# Patient Record
Sex: FEMALE | Race: WHITE | Employment: FULL TIME | URBAN - METROPOLITAN AREA
[De-identification: names, ages, dates, MRNs, and addresses within clinical notes are randomized per-mention and may not be internally consistent; named-entity substitution may affect disease eponyms.]

---

## 2023-12-21 ENCOUNTER — APPOINTMENT (OUTPATIENT)
Dept: PHYSICAL THERAPY | Age: 48
End: 2023-12-21
Payer: COMMERCIAL

## 2023-12-22 ENCOUNTER — HOSPITAL ENCOUNTER (OUTPATIENT)
Dept: PHYSICAL THERAPY | Age: 48
Setting detail: RECURRING SERIES
Discharge: HOME | End: 2023-12-25
Payer: COMMERCIAL

## 2023-12-22 PROCEDURE — 97110 THERAPEUTIC EXERCISES: CPT

## 2023-12-22 PROCEDURE — 97140 MANUAL THERAPY 1/> REGIONS: CPT

## 2023-12-22 NOTE — PROGRESS NOTES
Jorge Carter  : 1975  [facility]  Secondary:  [address] @ [address] [facility] [address]  Phone: [phone]  Fax: [phone] Plan Frequency: 2x/week for 6 weeks    Plan of Care/Certification Expiration Date: 24        Plan of Care/Certification Expiration Date:  Plan of Care/Certification Expiration Date: 24    Frequency/Duration:   Plan Frequency: 2x/week for 6 weeks      Time In/Out:   Time In: 1445  Time Out: 26      PT Visit Info:    Plan Frequency: 2x/week for 6 weeks      Visit Count:  2    OUTPATIENT PHYSICAL THERAPY:   Treatment Note 2023       Episode  (cervical stiffness/thoracic pain)               Treatment Diagnosis:  M54.6, M43.8  No data found  Medical/Referring Diagnosis:    Cervical spondylosis    Referring Physician:  Marilou Goodman MD MD Orders:  PT Eval and Treat   Return MD Appt:  TBD   Date of Onset:   Insidious onset 1 month ago  Allergies:   Patient has no allergy information on record. Restrictions/Precautions:   None      Interventions Planned (Treatment may consist of any combination of the following):     See Assessment Note    Subjective Comments: Patient reports that she is doing well today. Patient states that she has had some soreness and an ache in bilateral upper traps. Initial Pain Level[de-identified]      2/10  Post Session Pain Level:        1-2/10  Medications Last Reviewed:  2023  Updated Objective Findings:  None Today  Treatment   TREATMENT:   THERAPEUTIC ACTIVITY: ( see below for minutes): Therapeutic activities per grid below to improve mobility, strength, balance and coordination. Required minimal visual, verbal, manual and tactile cues to improve independence and safety with daily activities . THERAPEUTIC EXERCISE: (see below for minutes): Exercises per grid below to improve mobility, strength, balance and coordination.  Required minimal verbal and manual cues to promote

## 2023-12-27 ENCOUNTER — HOSPITAL ENCOUNTER (OUTPATIENT)
Dept: PHYSICAL THERAPY | Age: 48
Setting detail: RECURRING SERIES
Discharge: HOME | End: 2023-12-30
Payer: COMMERCIAL

## 2023-12-27 PROCEDURE — 97140 MANUAL THERAPY 1/> REGIONS: CPT

## 2023-12-27 PROCEDURE — 97110 THERAPEUTIC EXERCISES: CPT

## 2023-12-27 NOTE — PROGRESS NOTES
cues to promote proper body alignment, promote proper body posture and promote proper body mechanics. Progressed resistance, range, repetitions and complexity of movement as indicated. MANUAL THERAPY: (see below for minutes): Joint mobilization and Soft tissue mobilization was utilized and necessary because of the patient's restricted joint motion, painful spasm, loss of articular motion and restricted motion of soft tissue. MODALITIES: (see below for minutes): to decrease pain   SELF CARE: (see below for minutes): Procedure(s) (per grid) utilized to improve and/or restore self-care/home management as related to dressing, bathing and grooming. Required minimal verbal cueing to facilitate activities of daily living skills and compensatory activities     Date: 12/18/23 12/22/23 12/27/23 (visit 3)     Modalities:                                Therapeutic Exercise: 10 min 30 min 30 min      Cervical retraction 15x Cervical retraction 15x 2 Cervical retraction x15, seated      Cervical extension with rolled towel 15x Cervical extension with rolled towel 15x2 Cervical extension with rolled towed x15      Scapular retraction 15x Rows and extension  Green band  2 x 15 Rows 3x10 black    Shoulder extension on hold due to surgery      Posture slouch to overcorrect 5x ER with scapular retraction  Green 2 x15 ER with scapula retraction 3x10 green        Seated cervical extension with chin tuck x15             Proprioceptive Activities:                                Manual Therapy:  12 min 15 min       Soft tissue mobilization of B upper traps 2 x15 STM to B upper trapezius             Functional Activities:                                          Goals: (Goals have been discussed and agreed upon with patient.)  Short-Term Functional Goals: Time Frame: 3 weeks  Patient will be independent with HEP. Patient with be able to sit with good posture to work on computer without pain.   Patient will have cervical spine ROM WNL in

## 2024-01-02 ENCOUNTER — HOSPITAL ENCOUNTER (OUTPATIENT)
Dept: PHYSICAL THERAPY | Age: 49
Setting detail: RECURRING SERIES
Discharge: HOME | End: 2024-01-05
Payer: COMMERCIAL

## 2024-01-02 PROCEDURE — 97140 MANUAL THERAPY 1/> REGIONS: CPT

## 2024-01-02 PROCEDURE — 97110 THERAPEUTIC EXERCISES: CPT

## 2024-01-02 NOTE — PROGRESS NOTES
Goals: (Goals have been discussed and agreed upon with patient.)  Short-Term Functional Goals: Time Frame: 3 weeks  Patient will be independent with HEP.   Patient with be able to sit with good posture to work on computer without pain.  Patient will have cervical spine ROM WNL in order to look down and up without pain.  Discharge Goals: Time Frame: 6 weeks  Patient will be able to fall asleep and sleep entire night without waking due to neck pain.  Patient will have NDI score of 3 or less indicating improve function.  Patient will be able to complete all daily and work activities with 0/10 neck pain and no headaches.        Outcome Measure:   Tool Used: Neck Disability Index (NDI)  Score:  Initial: 8/50  Most Recent: X/50 (Date: -- )   Interpretation of Score: The Neck Disability Index is a revised form of the Oswestry Low Back Pain Index and is designed to measure the activities of daily living in person's with neck pain.  Each section is scored on a 0-5 scale, 5 representing the greatest disability.  The scores of each section are added together for a total score of 50.     Treatment/Session Summary:    Treatment Assessment: Patient able to complete all exercises and treatments today without increased pain. Initiated manual cervical traction today with no pain during or after. Monitor pain and symptoms at next visit and progress accordingly. Patient returned to work today working from home and returning to the office for the rest of the week.     Communication/Consultation:  None today  Equipment provided today:  None  Recommendations/Intent for next treatment session: Next visit will focus on posture correction and restoring strength, ROM and function.    >Total Treatment Billable Duration:  43 minutes   Time In: 1444  Time Out: 1527    Jerad Coronel PT         Charge Capture  [facility]  Appt Desk     Future Appointments   Date Time Provider Department Center   1/4/2024  2:45 PM Roxana Rosa, [facility]

## 2024-01-04 ENCOUNTER — HOSPITAL ENCOUNTER (OUTPATIENT)
Dept: PHYSICAL THERAPY | Age: 49
Setting detail: RECURRING SERIES
Discharge: HOME | End: 2024-01-07
Payer: COMMERCIAL

## 2024-01-04 PROCEDURE — 97110 THERAPEUTIC EXERCISES: CPT

## 2024-01-04 PROCEDURE — 97140 MANUAL THERAPY 1/> REGIONS: CPT

## 2024-01-04 NOTE — PROGRESS NOTES
Elena Jeremy  : 1975  Primary: Tai Wang ([facility])  Secondary:  [facility] [place]  [place] [address]  Phone: [phone]  Fax: [phone] Plan Frequency: 2x/week for 6 weeks    Plan of Care/Certification Expiration Date: 24        Plan of Care/Certification Expiration Date:  Plan of Care/Certification Expiration Date: 24    Frequency/Duration:   Plan Frequency: 2x/week for 6 weeks      Time In/Out:   Time In: 1445  Time Out: 1530      PT Visit Info:    Plan Frequency: 2x/week for 6 weeks      Visit Count:  5    OUTPATIENT PHYSICAL THERAPY:   Treatment Note 2024       Episode  (cervical stiffness/thoracic pain)               Treatment Diagnosis:  M54.6, M43.8  No data found  Medical/Referring Diagnosis:    Cervical spondylosis    Referring Physician:  Chucho Grant Jr., MD MD Orders:  PT Eval and Treat   Return MD Appt:  TBD   Date of Onset:   Insidious onset 1 month ago  Allergies:   Patient has no allergy information on record.  Restrictions/Precautions:   None    Pt had laparoscopic hysterectomy 3 weeks ago.  Interventions Planned (Treatment may consist of any combination of the following):     See Assessment Note    Subjective Comments: Patient reports no pain prior to treatment. She reports pain looking up for long periods of time.    Initial Pain Level::      0/10  Post Session Pain Level:        0/10  Medications Last Reviewed:  2024  Updated Objective Findings:  None Today  Treatment   TREATMENT:   THERAPEUTIC ACTIVITY: ( see below for minutes): Therapeutic activities per grid below to improve mobility, strength, balance and coordination. Required minimal visual, verbal, manual and tactile cues to improve independence and safety with daily activities .  THERAPEUTIC EXERCISE: (see below for minutes): Exercises per grid below to improve mobility, strength, balance and coordination. Required minimal verbal and manual cues to

## 2024-01-09 ENCOUNTER — HOSPITAL ENCOUNTER (OUTPATIENT)
Dept: PHYSICAL THERAPY | Age: 49
Setting detail: RECURRING SERIES
End: 2024-01-09
Payer: COMMERCIAL

## 2024-01-11 ENCOUNTER — HOSPITAL ENCOUNTER (OUTPATIENT)
Dept: PHYSICAL THERAPY | Age: 49
Setting detail: RECURRING SERIES
Discharge: HOME | End: 2024-01-14
Payer: COMMERCIAL

## 2024-01-11 PROCEDURE — 97110 THERAPEUTIC EXERCISES: CPT

## 2024-01-11 PROCEDURE — 97140 MANUAL THERAPY 1/> REGIONS: CPT

## 2024-01-16 ENCOUNTER — HOSPITAL ENCOUNTER (OUTPATIENT)
Dept: PHYSICAL THERAPY | Age: 49
Setting detail: RECURRING SERIES
Discharge: HOME | End: 2024-01-19
Payer: COMMERCIAL

## 2024-01-16 PROCEDURE — 97110 THERAPEUTIC EXERCISES: CPT

## 2024-01-16 PROCEDURE — 97140 MANUAL THERAPY 1/> REGIONS: CPT

## 2024-01-16 NOTE — PROGRESS NOTES
Elena Jeremy  : 1975  Primary: Tai Wang (Michaela DAVEY)  Secondary:  [facility] [place]  [place] [address]  Phone: [phone]  Fax: [phone] Plan Frequency: 2x/week for 6 weeks    Plan of Care/Certification Expiration Date: 24        Plan of Care/Certification Expiration Date:  Plan of Care/Certification Expiration Date: 24    Frequency/Duration:   Plan Frequency: 2x/week for 6 weeks      Time In/Out:   Time In: 1446  Time Out: 1530      PT Visit Info:    Plan Frequency: 2x/week for 6 weeks      Visit Count:  7    OUTPATIENT PHYSICAL THERAPY:   Treatment Note 2024       Episode  (cervical stiffness/thoracic pain)               Treatment Diagnosis:  M54.6, M43.8  No data found  Medical/Referring Diagnosis:    Cervical spondylosis    Referring Physician:  Chucho Grant Jr., MD MD Orders:  PT Eval and Treat   Return MD Appt:  TBD   Date of Onset:   Insidious onset 1 month ago  Allergies:   Patient has no allergy information on record.  Restrictions/Precautions:   None    Pt had laparoscopic hysterectomy 3 weeks ago.  Interventions Planned (Treatment may consist of any combination of the following):     See Assessment Note    Subjective Comments: Patient states that she cleaned out her closet over the weekend with repetitive reaching overhead and has had increased neck stiffness and soreness the past few days.   Initial Pain Level::      0/10  Post Session Pain Level:        0/10  Medications Last Reviewed:  2024  Updated Objective Findings:  None Today  Treatment   TREATMENT:   THERAPEUTIC ACTIVITY: ( see below for minutes): Therapeutic activities per grid below to improve mobility, strength, balance and coordination. Required minimal visual, verbal, manual and tactile cues to improve independence and safety with daily activities .  THERAPEUTIC EXERCISE: (see below for minutes): Exercises per grid below to improve mobility, strength,

## 2024-01-18 ENCOUNTER — HOSPITAL ENCOUNTER (OUTPATIENT)
Dept: PHYSICAL THERAPY | Age: 49
Setting detail: RECURRING SERIES
End: 2024-01-18
Payer: COMMERCIAL

## 2024-01-23 ENCOUNTER — HOSPITAL ENCOUNTER (OUTPATIENT)
Dept: PHYSICAL THERAPY | Age: 49
Setting detail: RECURRING SERIES
Discharge: HOME | End: 2024-01-26
Payer: COMMERCIAL

## 2024-01-23 PROCEDURE — 97140 MANUAL THERAPY 1/> REGIONS: CPT

## 2024-01-23 PROCEDURE — 97110 THERAPEUTIC EXERCISES: CPT

## 2024-01-23 NOTE — PROGRESS NOTES
posture as needed throughout the day. Patient having the most pain and stiffness when laying in bed and trying to sleep. Plan to progress patient towards discharge and I HEP/management of symptoms over the next few weeks.     Communication/Consultation:  None today  Equipment provided today:  None  Recommendations/Intent for next treatment session: Next visit will focus on posture correction and restoring strength, ROM and function.    >Total Treatment Billable Duration: 40  minutes   Time In: 1445  Time Out: 1525    Jerad Coronel PT         Charge Capture  [facility]  Appt Desk     Future Appointments   Date Time Provider Department Center   1/25/2024  2:45 PM Jerad Coronel, PT [facility]   1/30/2024  2:45 PM Jerad Coronel, PT [facility]   2/1/2024  2:45 PM Jerad Coronel, PT [facility]   2/6/2024  3:30 PM Jerad Coronel, PT [facility]   2/8/2024  3:30 PM Jerad Coronel, PT [facility]   2/13/2024  3:30 PM Jerad Coronel, PT [facility]   2/15/2024  3:30 PM Jerad Coronel, PT [facility]   2/20/2024  3:30 PM Jerad Coronel, PT [facility]   2/22/2024  3:30 PM Jerad Coronel, PT [facility]   2/27/2024  3:30 PM Jerad Coronel, PT [facility]   2/29/2024  3:30 PM Jerad Coronel, PT [facility]

## 2024-01-25 ENCOUNTER — HOSPITAL ENCOUNTER (OUTPATIENT)
Dept: PHYSICAL THERAPY | Age: 49
Setting detail: RECURRING SERIES
Discharge: HOME | End: 2024-01-28
Payer: COMMERCIAL

## 2024-01-25 PROCEDURE — 97140 MANUAL THERAPY 1/> REGIONS: CPT

## 2024-01-25 PROCEDURE — 97110 THERAPEUTIC EXERCISES: CPT

## 2024-01-25 NOTE — PROGRESS NOTES
Elena Jeremy  : 1975  Primary: Tai Wang (Michaela DAVEY)  Secondary:  [facility] [place]  [place] [address]  Phone: [phone]  Fax: [phone] Plan Frequency: 2x/week for 6 weeks    Plan of Care/Certification Expiration Date: 24        Plan of Care/Certification Expiration Date:  Plan of Care/Certification Expiration Date: 24    Frequency/Duration:   Plan Frequency: 2x/week for 6 weeks      Time In/Out:   Time In: 1445  Time Out: 1524      PT Visit Info:    Plan Frequency: 2x/week for 6 weeks      Visit Count:  9    OUTPATIENT PHYSICAL THERAPY:   Progress and Treatment Note 2024       Episode  (cervical stiffness/thoracic pain)               Treatment Diagnosis:  M54.6, M43.8  No data found  Medical/Referring Diagnosis:    Cervical spondylosis    Referring Physician:  Chucho Grant Jr., MD MD Orders:  PT Eval and Treat   Return MD Appt:  TBD   Date of Onset:   Insidious onset 1 month ago  Allergies:   Patient has no allergy information on record.  Restrictions/Precautions:   None    Pt had laparoscopic hysterectomy 3 weeks ago.  Interventions Planned (Treatment may consist of any combination of the following):     See Assessment Note    Subjective Comments: Patient reports she is doing well today. Patient states that she has continued to have less neck pain and stiffness.    Initial Pain Level::      0/10  Post Session Pain Level:        0/10  Medications Last Reviewed:  2024  Updated Objective Findings:      24:   Cervical AROM in degrees: flexion 45, extension 70, L lateral flexion 40, R lateral flexion 40, L rotation 70, R rotation 75.  B UE strength 5/5.  Treatment   TREATMENT:   THERAPEUTIC ACTIVITY: ( see below for minutes): Therapeutic activities per grid below to improve mobility, strength, balance and coordination. Required minimal visual, verbal, manual and tactile cues to improve independence and safety with daily

## 2024-01-30 ENCOUNTER — HOSPITAL ENCOUNTER (OUTPATIENT)
Dept: PHYSICAL THERAPY | Age: 49
Setting detail: RECURRING SERIES
Discharge: HOME | End: 2024-02-02
Payer: COMMERCIAL

## 2024-01-30 PROCEDURE — 97110 THERAPEUTIC EXERCISES: CPT

## 2024-01-30 PROCEDURE — 97140 MANUAL THERAPY 1/> REGIONS: CPT

## 2024-01-30 NOTE — PROGRESS NOTES
Elena Jeremy  : 1975  Primary: Tai Wang (Michaela DAVEY)  Secondary:  [facility] [place]  [place] [address]  Phone: [phone]  Fax: [phone] Plan Frequency: 2x/week for 6 weeks    Plan of Care/Certification Expiration Date: 24        Plan of Care/Certification Expiration Date:  Plan of Care/Certification Expiration Date: 24    Frequency/Duration:   Plan Frequency: 2x/week for 6 weeks      Time In/Out:   Time In: 1445  Time Out: 1525      PT Visit Info:    Plan Frequency: 2x/week for 6 weeks      Visit Count:  10    OUTPATIENT PHYSICAL THERAPY:   Discharge and Treatment Note 2024       Episode  (cervical stiffness/thoracic pain)               Treatment Diagnosis:  M54.6, M43.8  No data found  Medical/Referring Diagnosis:    Cervical spondylosis    Referring Physician:  Chucho Grant Jr., MD MD Orders:  PT Eval and Treat   Return MD Appt:  TBD   Date of Onset:   Insidious onset 1 month ago  Allergies:   Patient has no allergy information on record.  Restrictions/Precautions:   None    Pt had laparoscopic hysterectomy 3 weeks ago.  Interventions Planned (Treatment may consist of any combination of the following):     See Assessment Note    Subjective Comments: Patient reports she has no neck pain at rest and when it is at the worst it is at 3/10 on the left side of her neck.     Initial Pain Level::      0/10  Post Session Pain Level:        0/10  Medications Last Reviewed:  2024  Updated Objective Findings:      24:   Cervical AROM in degrees: flexion 45, extension 70, L lateral flexion 40, R lateral flexion 40, L rotation 70, R rotation 75.  B UE strength 5/5.  Treatment   TREATMENT:   THERAPEUTIC ACTIVITY: ( see below for minutes): Therapeutic activities per grid below to improve mobility, strength, balance and coordination. Required minimal visual, verbal, manual and tactile cues to improve independence and safety with daily